# Patient Record
(demographics unavailable — no encounter records)

---

## 2024-10-08 NOTE — HISTORY OF PRESENT ILLNESS
[FreeTextEntry1] : 66 y/o man. Retired  Hx of kidney stones BPH/LUTS (which he sees Dr. Sommers for)  Renal sonogram today: none recent 24 hr urine: not done  No complaints at today's visit

## 2024-10-08 NOTE — HISTORY OF PRESENT ILLNESS
[FreeTextEntry1] : 64 y/o man. Retired  Hx of kidney stones BPH/LUTS (which he sees Dr. Sommers for)  Renal sonogram today: none recent 24 hr urine: not done  No complaints at today's visit

## 2024-10-08 NOTE — ASSESSMENT
[FreeTextEntry1] : 24 hr urine before next visit Ct stone hunt before next visit Follow up in 2 months

## 2024-12-02 NOTE — HISTORY OF PRESENT ILLNESS
[FreeTextEntry1] : 66 y/o man. Retired  Hx of kidney stones atrophic left kidney with staghorn---29% split function on NM renal scan march 2021---split function appears overestimated  CT stone hunt 10/16/2024: KIDNEYS/URETERS: Left-sided 3 cm staghorn calculus in the proximal collecting system with smaller calcifications in the left interpolar region as well as lower and pole. Associated severe hydronephrosis with renal cortical loss and cystic changes. No evidence of right hydroureter or hydronephrosis.  24 hr urine: he sent in to lab 2 days ago

## 2024-12-02 NOTE — ASSESSMENT
[FreeTextEntry1] : Diet modification reviewed at length- increasing fluids (primarily water), citrus is good, and decreasing/moderating salt, animal flesh protein, oxalate containing foods, and moderation of calcium intake (1000 mg/day is USRDA).  I reviewed with the patient the risks of metabolic stone disease given their underlying risk parameters (all of which include large stones, multiple stones, bilateral stones, family history, and young age), and the indications for 24 hour urine metabolic assessment.  We also discussed benefits of regular exercise and weight loss as independent risk reducers for stones. Printed guide given to patient  24 hr urine ordered to be done before next visit Renal sono ordered to be done at or before next visit Follow up in 6 months Also follow up in 1 month via telehealth to review latest 24 hr urine

## 2025-01-08 NOTE — ASSESSMENT
[FreeTextEntry1] : Reiterated discussion regarding diet Diet modification reviewed at length- increasing fluids (primarily water), citrus is good, and decreasing/moderating salt, animal flesh protein, oxalate containing foods, and moderation of calcium intake (1000 mg/day is USRDA).  I reviewed with the patient the risks of metabolic stone disease given their underlying risk parameters (all of which include large stones, multiple stones, bilateral stones, family history, and young age), and the indications for 24 hour urine metabolic assessment.  We also discussed benefits of regular exercise and weight loss as independent risk reducers for stones. Printed guide previously given to patient during his last visit.  Focus on low-salt diet and maintaining increased fluid intake as well as reducing animal protein portions.  24 hr urine ordered to be done before next visit Renal sono ordered to be done at or before next visit Follow up in 6 months

## 2025-01-08 NOTE — HISTORY OF PRESENT ILLNESS
[FreeTextEntry1] : Telehealth appointment today Able to reach patient Verbal consent provided by patient  66 y/o man. Retired  Hx of kidney stones atrophic left kidney with staghorn---29% split function on NM renal scan march 2021---split function appears overestimated  Most recent CT stone hunt 10/16/2024: KIDNEYS/URETERS: Left-sided 3 cm staghorn calculus in the proximal collecting system with smaller calcifications in the left interpolar region as well as lower and pole. Associated severe hydronephrosis with renal cortical loss and cystic changes. No evidence of right hydroureter or hydronephrosis.  24-hour urine: Volume 3.09 L, oxalate 50, pH 6.89, high sodium 242, high UUN and PCR

## 2025-07-08 NOTE — HISTORY OF PRESENT ILLNESS
[None] : None [FreeTextEntry1] : 66 y/o man. Retired  Hx of kidney stones atrophic left kidney with staghorn---29% split function on NM renal scan march 2021---split function appears overestimated  CT stone hunt 10/16/2024: KIDNEYS/URETERS: Left-sided 3 cm staghorn calculus in the proximal collecting system with smaller calcifications in the left interpolar region as well as lower and pole. Associated severe hydronephrosis with renal cortical loss and cystic changes. No evidence of right hydroureter or hydronephrosis.  Renal ultrasound today-no stones in the right kidney, stable appearance of left kidney  24-hour urine May 2025: Volume 2.89 L, sodium 112, oxalate 37, pH 6.18, improved UUN 10.64 and PCR 1.0  [Dysuria] : no dysuria [Hematuria - Gross] : no gross hematuria

## 2025-07-08 NOTE — ASSESSMENT
[FreeTextEntry1] : kidney stones stable stone burden   Reiterated discussion regarding diet Diet modification reviewed at length- increasing fluids (primarily water), citrus is good, and decreasing/moderating salt, animal flesh protein, oxalate containing foods, and moderation of calcium intake (1000 mg/day is USRDA).  I reviewed with the patient the risks of metabolic stone disease given their underlying risk parameters (all of which include large stones, multiple stones, bilateral stones, family history, and young age), and the indications for 24 hour urine metabolic assessment.  We also discussed benefits of regular exercise and weight loss as independent risk reducers for stones. Printed guide previously given to patient during his last visit.  Focus on low-salt diet and maintaining increased fluid intake as well as reducing animal protein portions.  24 hr urine ordered to be done before next visit Renal sono ordered to be done at or before next visit Follow up in 6 months  Elevated PSA: There is an order placed by Dr. Sommers last year for MRI of prostate New PSA drawn today Updated MRI prostate requisition See Dr. Sommers after completing MRI

## 2025-07-08 NOTE — HISTORY OF PRESENT ILLNESS
[None] : None [FreeTextEntry1] : 64 y/o man. Retired  Hx of kidney stones atrophic left kidney with staghorn---29% split function on NM renal scan march 2021---split function appears overestimated  CT stone hunt 10/16/2024: KIDNEYS/URETERS: Left-sided 3 cm staghorn calculus in the proximal collecting system with smaller calcifications in the left interpolar region as well as lower and pole. Associated severe hydronephrosis with renal cortical loss and cystic changes. No evidence of right hydroureter or hydronephrosis.  Renal ultrasound today-no stones in the right kidney, stable appearance of left kidney  24-hour urine May 2025: Volume 2.89 L, sodium 112, oxalate 37, pH 6.18, improved UUN 10.64 and PCR 1.0  [Dysuria] : no dysuria [Hematuria - Gross] : no gross hematuria